# Patient Record
Sex: FEMALE | Race: WHITE | NOT HISPANIC OR LATINO | Employment: FULL TIME | URBAN - METROPOLITAN AREA
[De-identification: names, ages, dates, MRNs, and addresses within clinical notes are randomized per-mention and may not be internally consistent; named-entity substitution may affect disease eponyms.]

---

## 2018-01-26 ENCOUNTER — OFFICE VISIT (OUTPATIENT)
Dept: ORTHOPEDIC SURGERY | Facility: CLINIC | Age: 23
End: 2018-01-26

## 2018-01-26 VITALS — TEMPERATURE: 98.4 F | BODY MASS INDEX: 21.69 KG/M2 | HEIGHT: 65 IN | WEIGHT: 130.2 LBS

## 2018-01-26 DIAGNOSIS — IMO0002 BURSITIS/TENDONITIS, SHOULDER: ICD-10-CM

## 2018-01-26 DIAGNOSIS — M25.511 ACUTE PAIN OF RIGHT SHOULDER: Primary | ICD-10-CM

## 2018-01-26 PROCEDURE — 99204 OFFICE O/P NEW MOD 45 MIN: CPT | Performed by: ORTHOPAEDIC SURGERY

## 2018-01-26 PROCEDURE — 20610 DRAIN/INJ JOINT/BURSA W/O US: CPT | Performed by: ORTHOPAEDIC SURGERY

## 2018-01-26 PROCEDURE — 73030 X-RAY EXAM OF SHOULDER: CPT | Performed by: ORTHOPAEDIC SURGERY

## 2018-01-26 RX ORDER — MULTIVIT-MIN/IRON/FOLIC ACID/K 18-600-40
CAPSULE ORAL
Refills: 1 | COMMUNITY
Start: 2017-12-21

## 2018-01-26 RX ADMIN — METHYLPREDNISOLONE ACETATE 80 MG: 80 INJECTION, SUSPENSION INTRA-ARTICULAR; INTRALESIONAL; INTRAMUSCULAR; SOFT TISSUE at 09:44

## 2018-01-26 RX ADMIN — BUPIVACAINE HYDROCHLORIDE 4 ML: 5 INJECTION, SOLUTION EPIDURAL; INTRACAUDAL at 09:44

## 2018-01-26 NOTE — PROGRESS NOTES
Right New Shoulder      Patient: Kristen Barney        YOB: 1995    Medical Record Number: [id number]        Chief Complaints: Right shoulder pain      History of Present Illness: This is a  22 y.o. female who presents with complaints of right shoulder pain she is left-hand dominant is been ongoing 6-8 weeks she has burning pain in the shoulder with decreased range of motion she has no night pain and temperature [place] but does have pain with that no history of similar symptoms he is tried ibuprofen and Tylenol.  She is a  at [employer] does not lift anything heavy but does a lot of repetitive overhead activity her symptoms are moderate intermittent aching stabbing grinding clicking popping snapping and swelling past medical history marked for anxiety          Allergies: No Known Allergies    Medications:   Home Medications:  No current outpatient prescriptions on file prior to visit.     No current facility-administered medications on file prior to visit.      Current Medications:  Scheduled Meds:  Continuous Infusions:  No current facility-administered medications for this visit.   PRN Meds:.    Past Medical History:   Diagnosis Date   • Anxiety    • Asthma    • Fracture, finger     left hand ring and small finger   • Joint pain     right shoulder   • Swelling     Right shoulder        Past Surgical History:   Procedure Laterality Date   • WISDOM TOOTH EXTRACTION Bilateral         Social History     Occupational History   • Not on file.     Social History Main Topics   • Smoking status: Never Smoker   • Smokeless tobacco: Never Used   • Alcohol use Yes      Comment: Social drinker   • Drug use: No   • Sexual activity: Defer    Social History     Social History Narrative   • No narrative on file        Family History   Problem Relation Age of Onset   • Hypertension Mother    • Breast cancer Maternal Grandmother    • Hypertension Maternal Grandfather    • Breast cancer Paternal Grandmother   "  • Liver cancer Paternal Grandmother              Review of Systems: HEENT point review of systems are remarkable for the pertinent positives listed in the chart by the patient the remainder are negative    Review of Systems      Physical Exam: 22 y.o. female  General Appearance:    Alert, cooperative, in no acute distress                 Vitals:    01/26/18 0918   Temp: 98.4 °F (36.9 °C)   TempSrc: Temporal Artery    Weight: 59.1 kg (130 lb 3.2 oz)   Height: 165.1 cm (65\")      Patient is alert and read ×3 no acute distress appears her above-listed at height weight and age.  Affect is normal respiratory rate is normal unlabored. Heart rate regular rate rhythm, sclera, dentition and hearing are normal for the purpose of this exam.    Ortho Exam Physical exam of the right shoulder reveals no overlying skin changes no lymphedema no lymphadenopathy.  Patient has active flexion 180 with mild symptoms abduction is similar external rotation is to 50 and internal rotation to the upper lumbar spine with mild symptoms.  Patient has good rotator cuff strength 4+ over 5 with isometric strength testing with pain.  Patient has a positive impingement and a positive Islas sign.  Patient has good cervical range of motion which is full and asymptomatic no radicular symptoms.  Patient has a normal elbow exam.  Good distal pulses are present  Patient has pain with overhead activity and a positive Neer sign and a positive empty can sign , a positive drop arm and a definitive painful arc    Large Joint Arthrocentesis  Date/Time: 1/26/2018 9:44 AM  Consent given by: patient  Site marked: site marked  Timeout: Immediately prior to procedure a time out was called to verify the correct patient, procedure, equipment, support staff and site/side marked as required   Supporting Documentation  Indications: pain   Procedure Details  Location: shoulder - R subacromial bursa  Preparation: Patient was prepped and draped in the usual sterile " fashion  Needle size: 25 G  Medications administered: 4 mL bupivacaine (PF) 0.5 %; 80 mg methylPREDNISolone acetate 80 MG/ML  Patient tolerance: patient tolerated the procedure well with no immediate complications                Radiology:   AP, Scapular Y and Axillary Lateral of the right shoulder were ordered/reviewed to evauate shoulder pain.  I've no comparative films these are normal I see no evidence of any acute bony pathology  Imaging Results (most recent)     None        Assessment/Plan:Right shoulder pain I think this is impingement I think her cuff is a little weak think she benefit from an injection.  I will start her on Modic with strict precautions for short period of time I'll start her into physical therapy.  We'll see her back in 4 weeks if she fails improvement we will pursue other means of testing

## 2018-01-28 RX ORDER — METHYLPREDNISOLONE ACETATE 80 MG/ML
80 INJECTION, SUSPENSION INTRA-ARTICULAR; INTRALESIONAL; INTRAMUSCULAR; SOFT TISSUE
Status: COMPLETED | OUTPATIENT
Start: 2018-01-26 | End: 2018-01-26

## 2018-01-28 RX ORDER — BUPIVACAINE HYDROCHLORIDE 5 MG/ML
4 INJECTION, SOLUTION EPIDURAL; INTRACAUDAL
Status: COMPLETED | OUTPATIENT
Start: 2018-01-26 | End: 2018-01-26

## 2018-01-28 RX ORDER — MELOXICAM 15 MG/1
TABLET ORAL
Qty: 30 TABLET | Refills: 3 | Status: SHIPPED | OUTPATIENT
Start: 2018-01-28

## 2018-02-23 ENCOUNTER — OFFICE VISIT (OUTPATIENT)
Dept: ORTHOPEDIC SURGERY | Facility: CLINIC | Age: 23
End: 2018-02-23

## 2018-02-23 VITALS — WEIGHT: 128.6 LBS | TEMPERATURE: 97.4 F | HEIGHT: 64 IN | BODY MASS INDEX: 21.95 KG/M2

## 2018-02-23 DIAGNOSIS — IMO0002 BURSITIS/TENDONITIS, SHOULDER: Primary | ICD-10-CM

## 2018-02-23 PROCEDURE — 99212 OFFICE O/P EST SF 10 MIN: CPT | Performed by: ORTHOPAEDIC SURGERY

## 2018-02-23 NOTE — PROGRESS NOTES
"Right Shoulder Follow Up      Patient: Kristen Barney        YOB: 1995            Chief Complaints: Right Shoulder pain    Chief Complaint   Patient presents with   • Right Shoulder - Follow-up       History of Present Illness:Patient is here follow right shoulder pain she states she doing great injection really helped she does admit to not doing a lot of physical therapy but is back to her activities but overall doing well      Physical Exam: 22 y.o. female  General Appearance:    Alert, cooperative, in no acute distress                   Vitals:    02/23/18 0826   Temp: 97.4 °F (36.3 °C)   TempSrc: Temporal Artery    Weight: 58.3 kg (128 lb 9.6 oz)   Height: 162.6 cm (64\")        Patient is alert and read ×3 no acute distress appears her above-listed at height weight and age.  Affect is normal respiratory rate is normal unlabored. Heart rate regular rate rhythm, sclera, dentition and hearing are normal for the purpose of this exam.      Ortho Exam  Physical exam of the right shoulder reveals no overlying skin changes no lymphedema no lymphadenopathy.  The patient can actively flex to 180, abduction is similar external rotation is to 50, internal rotation to the upper lumbar spine.  Rotator cuff strength is 4+ to 5 over 5 with isometric strength testing without symptoms.  The patient has good cervical range of motion full and asymptomatic no radicular symptoms and a normal elbow exam.  Patient has good distal pulses            Assessment/Plan:      Right shoulder impingement which is improved with conservative measures plan is for her to continue progress her activities and she can follow-up as needed          "

## 2021-04-16 ENCOUNTER — BULK ORDERING (OUTPATIENT)
Dept: CASE MANAGEMENT | Facility: OTHER | Age: 26
End: 2021-04-16

## 2021-04-16 DIAGNOSIS — Z23 IMMUNIZATION DUE: ICD-10-CM
